# Patient Record
Sex: MALE | Race: OTHER | HISPANIC OR LATINO
[De-identification: names, ages, dates, MRNs, and addresses within clinical notes are randomized per-mention and may not be internally consistent; named-entity substitution may affect disease eponyms.]

---
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
## 2021-02-24 ENCOUNTER — NON-APPOINTMENT (OUTPATIENT)
Age: 21
End: 2021-02-24

## 2021-06-08 ENCOUNTER — EMERGENCY (EMERGENCY)
Facility: HOSPITAL | Age: 21
LOS: 1 days | Discharge: HOME | End: 2021-06-08
Admitting: EMERGENCY MEDICINE
Payer: MEDICAID

## 2021-06-08 VITALS
TEMPERATURE: 99 F | SYSTOLIC BLOOD PRESSURE: 127 MMHG | HEART RATE: 86 BPM | RESPIRATION RATE: 16 BRPM | OXYGEN SATURATION: 100 % | DIASTOLIC BLOOD PRESSURE: 80 MMHG

## 2021-06-08 PROCEDURE — 99284 EMERGENCY DEPT VISIT MOD MDM: CPT

## 2021-06-08 NOTE — ED PROVIDER NOTE - CLINICAL SUMMARY MEDICAL DECISION MAKING FREE TEXT BOX
This is a 21 yr old M, pmh ADHD, Asperger's with c/o acting out behaviour. As per ems, mother took him to the [employer] end of the service mother told him it is time for to leave he did not wanted to do that and started to yell and scream.   Pt states " end of the day  he is a normal human being, he did not threaten or hurt anyone"   Collateral info obtained by sw from father, refer to her note. There is no clinical evidence of intoxication, or any acute medical problem requiring immediate intervention.  Pt will be picked up by father.

## 2021-06-08 NOTE — ED ADULT NURSE NOTE - NSIMPLEMENTINTERV_GEN_ALL_ED
Implemented All Universal Safety Interventions:  [place] to call system. Call bell, personal items and telephone within reach. Instruct patient to call for assistance. Room bathroom lighting operational. Non-slip footwear when patient is off stretcher. Physically safe environment: no spills, clutter or unnecessary equipment. Stretcher in lowest position, wheels locked, appropriate side rails in place.

## 2021-06-08 NOTE — ED ADULT TRIAGE NOTE - CHIEF COMPLAINT QUOTE
pt brought in by ems from [facility], pt with hx of autism, pt started acting up with is mother, started yelling and screaming, currently calm and cooperative.

## 2021-06-08 NOTE — ED PROVIDER NOTE - OBJECTIVE STATEMENT
This is a 21 yr old M, pmh ADHD, Asperger's with c/o acting out behaviour. As per ems, mother took him to the [employer] end of the service mother told him it is time for to leave he did not wanted to do that and started to yell and scream.   Pt states " end of the day  he is a normal human being, he did not threaten or hurt anyone"
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
## 2021-06-08 NOTE — ED PROVIDER NOTE - CROS ED ROS STATEMENT
H/O PE- will  ? surgical requirement, will hold Eliquis for now until seen by ortho
all other ROS negative except as per HPI
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
## 2021-06-08 NOTE — ED PROVIDER NOTE - PATIENT PORTAL LINK FT
You can access the FollowMyHealth Patient Portal offered by [facility] by registering at the following website: [email or web address]. By joining [employer]’s FollowMyHealth portal, you will also be able to view your health information using other applications (apps) compatible with our system.

## 2021-06-08 NOTE — ED ADULT NURSE NOTE - CHIEF COMPLAINT QUOTE
pt brought in by ems from [facility], pt with hx of autism, pt started acting up with is mother, started yelling and screaming, currently calm and cooperative.

## 2021-11-17 PROBLEM — F84.5 ASPERGER'S SYNDROME: Chronic | Status: ACTIVE | Noted: 2021-06-08

## 2021-11-24 ENCOUNTER — OUTPATIENT (OUTPATIENT)
Dept: OUTPATIENT SERVICES | Age: 21
LOS: 1 days | End: 2021-11-24

## 2021-11-24 ENCOUNTER — APPOINTMENT (OUTPATIENT)
Dept: PEDIATRIC ADOLESCENT MEDICINE | Facility: HOSPITAL | Age: 21
End: 2021-11-24
Payer: MEDICAID

## 2021-11-24 VITALS
HEIGHT: 72.25 IN | BODY MASS INDEX: 25.92 KG/M2 | DIASTOLIC BLOOD PRESSURE: 71 MMHG | WEIGHT: 193.5 LBS | HEART RATE: 92 BPM | SYSTOLIC BLOOD PRESSURE: 127 MMHG

## 2021-11-24 DIAGNOSIS — R45.89 OTHER SYMPTOMS AND SIGNS INVOLVING EMOTIONAL STATE: ICD-10-CM

## 2021-11-24 DIAGNOSIS — F84.0 AUTISTIC DISORDER: ICD-10-CM

## 2021-11-24 DIAGNOSIS — Z00.00 ENCOUNTER FOR GENERAL ADULT MEDICAL EXAMINATION W/OUT ABNORMAL FINDINGS: ICD-10-CM

## 2021-11-24 DIAGNOSIS — B35.1 TINEA UNGUIUM: ICD-10-CM

## 2021-11-24 PROCEDURE — 99395 PREV VISIT EST AGE 18-39: CPT

## 2021-11-30 PROBLEM — B35.1 TINEA UNGUIUM: Status: ACTIVE | Noted: 2021-11-30

## 2021-11-30 PROBLEM — R45.89 DEPRESSED MOOD: Status: ACTIVE | Noted: 2019-02-01

## 2021-11-30 PROBLEM — F84.0 AUTISM SPECTRUM DISORDER REQUIRING VERY SUBSTANTIAL SUPPORT (LEVEL 3): Status: ACTIVE | Noted: 2017-02-08
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
## 2021-11-30 NOTE — PHYSICAL EXAM
[Alert] : alert [No Acute Distress] : no acute distress [Normocephalic] : normocephalic [Atraumatic] : atraumatic [EOMI Bilateral] : EOMI bilateral [PERRLA] : BLAIRE [Conjunctivae with no discharge] : conjunctivae with no discharge [Clear tympanic membranes with bony landmarks and light reflex present bilaterally] : clear tympanic membranes with bony landmarks and light reflex present bilaterally  [Pink Nasal Mucosa] : pink nasal mucosa [Nares Patent] : nares patent [No Discharge] : no discharge [Nonerythematous Oropharynx] : nonerythematous oropharynx [Palate Intact] : palate intact [Uvula Midline] : uvula midline [Supple, full passive range of motion] : supple, full passive range of motion [No Palpable Masses] : no palpable masses [Clear to Auscultation Bilaterally] : clear to auscultation bilaterally [Regular Rate and Rhythm] : regular rate and rhythm [Normal S1, S2 audible] : normal S1, S2 audible [No Murmurs] : no murmurs [Soft] : soft [NonTender] : non tender [Non Distended] : non distended [Normoactive Bowel Sounds] : normoactive bowel sounds [No Hepatomegaly] : no hepatomegaly [No Splenomegaly] : no splenomegaly [No Abnormal Lymph Nodes Palpated] : no abnormal lymph nodes palpated [Normal Muscle Tone] : normal muscle tone [No Gait Asymmetry] : no gait asymmetry [No pain or deformities with palpation of bone, muscles, joints] : no pain or deformities with palpation of bone, muscles, joints [+2 Patella DTR] : +2 patella DTR [Cranial Nerves Grossly Intact] : cranial nerves grossly intact [de-identified] : dryness and flaking of soles of both feet, compacted R big toe nail, no bleeding or swelling appreciated, no erythema

## 2021-11-30 NOTE — REVIEW OF SYSTEMS
[Constipation] : constipation [Dry Skin] : dry skin [Epistaxis] : epistaxis [Malaise] : no malaise [Difficulty with Sleep] : no difficulty with sleep [Headache] : no headache [Ear Pain] : no ear pain [Nasal Discharge] : no nasal discharge [Nasal Congestion] : no nasal congestion [Sinus Pressure] : no sinus pressure [Sore Throat] : no sore throat [Chest Pain] : no chest pain [Tachypnea] : not tachypneic [Cough] : no cough [Congestion] : no congestion [Shortness of Breath] : no shortness of breath [Appetite Changes] : no appetite changes [Vomiting] : no vomiting [Diarrhea] : no diarrhea [Abdominal Pain] : no abdominal pain [Weakness] : no weakness [Dizziness] : no dizziness [Rash] : no rash [Itching] : no itching [Enlarged Lymph Nodes] : no enlarged lymph nodes [Tender Lymph Nodes] : non tender  lymph nodes [Dysuria] : no dysuria [Hematuria] : no hematuria

## 2021-11-30 NOTE — DISCUSSION/SUMMARY
[FreeTextEntry1] : Tai is a 21 year old M, with PMHx of ASD, presenting for annual check-up. \par \par Plan: \par 1. Psych services: \par - Following psychiatric hospitalization at [facility], patient is being connected to outpatient psychiatric and psychological services to manage medications, therapy, and behavioral plan.\par - Patient to obtain Quantiferon Gold test for TB in lieu of PPD, along with documentation of physical exam (dad will call to discuss over the phone, as he had to leave for a work meeting during the appt)\par \par 2. tinea unguium \par - Refer to Podiatry for follow up. Recommend comfortable shoes in the interim and keeping area clean. \par \par 3. Immunizations:\par - Patient to receive HPV and flu shot at next visit following discussion and if consent given.\par \par Father to call office after Thanksgiving weekend to make appt for vaccines and csan do lab work then as well. \par

## 2021-12-01 ENCOUNTER — OUTPATIENT (OUTPATIENT)
Dept: OUTPATIENT SERVICES | Facility: HOSPITAL | Age: 21
LOS: 1 days | End: 2021-12-01
Payer: MEDICAID

## 2021-12-01 PROCEDURE — G9005: CPT

## 2021-12-14 DIAGNOSIS — Z71.89 OTHER SPECIFIED COUNSELING: ICD-10-CM

## 2022-10-17 ENCOUNTER — EMERGENCY (EMERGENCY)
Facility: HOSPITAL | Age: 22
LOS: 1 days | Discharge: HOME | End: 2022-10-17
Attending: EMERGENCY MEDICINE | Admitting: EMERGENCY MEDICINE

## 2022-10-17 VITALS
DIASTOLIC BLOOD PRESSURE: 76 MMHG | SYSTOLIC BLOOD PRESSURE: 129 MMHG | TEMPERATURE: 98 F | OXYGEN SATURATION: 100 % | HEART RATE: 92 BPM | RESPIRATION RATE: 18 BRPM

## 2022-10-17 VITALS
HEART RATE: 90 BPM | TEMPERATURE: 98 F | DIASTOLIC BLOOD PRESSURE: 72 MMHG | SYSTOLIC BLOOD PRESSURE: 129 MMHG | RESPIRATION RATE: 18 BRPM | OXYGEN SATURATION: 100 %

## 2022-10-17 DIAGNOSIS — F84.0 AUTISTIC DISORDER: ICD-10-CM

## 2022-10-17 LAB
ALBUMIN SERPL ELPH-MCNC: 4.5 G/DL — SIGNIFICANT CHANGE UP (ref 3.3–5)
ALP SERPL-CCNC: 51 U/L — SIGNIFICANT CHANGE UP (ref 40–120)
ALT FLD-CCNC: 17 U/L — SIGNIFICANT CHANGE UP (ref 4–41)
ANION GAP SERPL CALC-SCNC: 9 MMOL/L — SIGNIFICANT CHANGE UP (ref 7–14)
APAP SERPL-MCNC: 26 UG/ML — HIGH (ref 15–25)
APPEARANCE UR: CLEAR — SIGNIFICANT CHANGE UP
AST SERPL-CCNC: 25 U/L — SIGNIFICANT CHANGE UP (ref 4–40)
BASOPHILS # BLD AUTO: 0.02 K/UL — SIGNIFICANT CHANGE UP (ref 0–0.2)
BASOPHILS NFR BLD AUTO: 0.4 % — SIGNIFICANT CHANGE UP (ref 0–2)
BILIRUB DIRECT SERPL-MCNC: <0.2 MG/DL — SIGNIFICANT CHANGE UP (ref 0–0.3)
BILIRUB INDIRECT FLD-MCNC: >0.5 MG/DL — SIGNIFICANT CHANGE UP (ref 0–1)
BILIRUB SERPL-MCNC: 0.7 MG/DL — SIGNIFICANT CHANGE UP (ref 0.2–1.2)
BILIRUB UR-MCNC: NEGATIVE — SIGNIFICANT CHANGE UP
BUN SERPL-MCNC: 13 MG/DL — SIGNIFICANT CHANGE UP (ref 7–23)
CALCIUM SERPL-MCNC: 9.8 MG/DL — SIGNIFICANT CHANGE UP (ref 8.4–10.5)
CHLORIDE SERPL-SCNC: 103 MMOL/L — SIGNIFICANT CHANGE UP (ref 98–107)
CO2 SERPL-SCNC: 29 MMOL/L — SIGNIFICANT CHANGE UP (ref 22–31)
COLOR SPEC: SIGNIFICANT CHANGE UP
CREAT SERPL-MCNC: 1.21 MG/DL — SIGNIFICANT CHANGE UP (ref 0.5–1.3)
DIFF PNL FLD: NEGATIVE — SIGNIFICANT CHANGE UP
EGFR: 87 ML/MIN/1.73M2 — SIGNIFICANT CHANGE UP
EOSINOPHIL # BLD AUTO: 0.03 K/UL — SIGNIFICANT CHANGE UP (ref 0–0.5)
EOSINOPHIL NFR BLD AUTO: 0.6 % — SIGNIFICANT CHANGE UP (ref 0–6)
ETHANOL SERPL-MCNC: <10 MG/DL — SIGNIFICANT CHANGE UP
GLUCOSE SERPL-MCNC: 80 MG/DL — SIGNIFICANT CHANGE UP (ref 70–99)
GLUCOSE UR QL: NEGATIVE — SIGNIFICANT CHANGE UP
HCT VFR BLD CALC: 45.7 % — SIGNIFICANT CHANGE UP (ref 39–50)
HGB BLD-MCNC: 16 G/DL — SIGNIFICANT CHANGE UP (ref 13–17)
IANC: 2.68 K/UL — SIGNIFICANT CHANGE UP (ref 1.8–7.4)
IMM GRANULOCYTES NFR BLD AUTO: 0.2 % — SIGNIFICANT CHANGE UP (ref 0–0.9)
KETONES UR-MCNC: NEGATIVE — SIGNIFICANT CHANGE UP
LEUKOCYTE ESTERASE UR-ACNC: NEGATIVE — SIGNIFICANT CHANGE UP
LYMPHOCYTES # BLD AUTO: 2.15 K/UL — SIGNIFICANT CHANGE UP (ref 1–3.3)
LYMPHOCYTES # BLD AUTO: 40.6 % — SIGNIFICANT CHANGE UP (ref 13–44)
MCHC RBC-ENTMCNC: 29.3 PG — SIGNIFICANT CHANGE UP (ref 27–34)
MCHC RBC-ENTMCNC: 35 GM/DL — SIGNIFICANT CHANGE UP (ref 32–36)
MCV RBC AUTO: 83.5 FL — SIGNIFICANT CHANGE UP (ref 80–100)
MONOCYTES # BLD AUTO: 0.41 K/UL — SIGNIFICANT CHANGE UP (ref 0–0.9)
MONOCYTES NFR BLD AUTO: 7.7 % — SIGNIFICANT CHANGE UP (ref 2–14)
NEUTROPHILS # BLD AUTO: 2.68 K/UL — SIGNIFICANT CHANGE UP (ref 1.8–7.4)
NEUTROPHILS NFR BLD AUTO: 50.5 % — SIGNIFICANT CHANGE UP (ref 43–77)
NITRITE UR-MCNC: NEGATIVE — SIGNIFICANT CHANGE UP
NRBC # BLD: 0 /100 WBCS — SIGNIFICANT CHANGE UP (ref 0–0)
NRBC # FLD: 0 K/UL — SIGNIFICANT CHANGE UP (ref 0–0)
PCP SPEC-MCNC: SIGNIFICANT CHANGE UP
PH UR: 7 — SIGNIFICANT CHANGE UP (ref 5–8)
PLATELET # BLD AUTO: 163 K/UL — SIGNIFICANT CHANGE UP (ref 150–400)
POTASSIUM SERPL-MCNC: 4.3 MMOL/L — SIGNIFICANT CHANGE UP (ref 3.5–5.3)
POTASSIUM SERPL-SCNC: 4.3 MMOL/L — SIGNIFICANT CHANGE UP (ref 3.5–5.3)
PROT SERPL-MCNC: 7.2 G/DL — SIGNIFICANT CHANGE UP (ref 6–8.3)
PROT UR-MCNC: NEGATIVE — SIGNIFICANT CHANGE UP
RBC # BLD: 5.47 M/UL — SIGNIFICANT CHANGE UP (ref 4.2–5.8)
RBC # FLD: 14 % — SIGNIFICANT CHANGE UP (ref 10.3–14.5)
SALICYLATES SERPL-MCNC: <0.3 MG/DL — LOW (ref 15–30)
SARS-COV-2 RNA SPEC QL NAA+PROBE: SIGNIFICANT CHANGE UP
SODIUM SERPL-SCNC: 141 MMOL/L — SIGNIFICANT CHANGE UP (ref 135–145)
SP GR SPEC: 1.02 — SIGNIFICANT CHANGE UP (ref 1.01–1.05)
UROBILINOGEN FLD QL: SIGNIFICANT CHANGE UP
WBC # BLD: 5.3 K/UL — SIGNIFICANT CHANGE UP (ref 3.8–10.5)
WBC # FLD AUTO: 5.3 K/UL — SIGNIFICANT CHANGE UP (ref 3.8–10.5)

## 2022-10-17 PROCEDURE — 99285 EMERGENCY DEPT VISIT HI MDM: CPT

## 2022-10-17 PROCEDURE — 90792 PSYCH DIAG EVAL W/MED SRVCS: CPT | Mod: GC

## 2022-10-17 NOTE — ED PROVIDER NOTE - WET READ LAUNCH FT
There are no Wet Read(s) to document. 5-Fu Pregnancy And Lactation Text: This medication is Pregnancy Category X and contraindicated in pregnancy and in women who may become pregnant. It is unknown if this medication is excreted in breast milk.

## 2022-10-17 NOTE — ED BEHAVIORAL HEALTH ASSESSMENT NOTE - SUMMARY
The patient is a 22 year old young man, single, domiciled with parents and 3 siblings-all of which have developmental disabilities (microcephaly), works at a fast food restaurant, past history of ASD and ADHD not currently in psychiatric treatment, 2 prior psychiatric hospitalizations-- one about 1.5 years ago for aggression and over 5 years ago for behavioral issues, no prior SA or SI, no hx of NSSIB, history of verbal but not physical aggression, no legal issues, no relevant PMH, no substance abuse history, who is presenting after tylenol overdose, took 15-20x 500mg tablets and subsequently alerted his father who brought him to the ED.   On assessment, pt is not currently depressed, adamantly denies intent to harm himself by taking the 15-20x 500mg tablets of Tylenol, father corroborates that pt has no history of SA/SI or self harm and denies recent stressors or noting recent change in mood or behavior. Though pt is not at increased risk of intentional self harm, pt is at increased risk of unintentional self harm and should not have access to medications given this. At this time, pt is not interested in psychiatric hospitalization or outpatient treatment and does not meet criteria for inpatient admission at this time.       Plan:   -Treat and release once pt is medically cleared  -Safety planning completed with pt and his father, see note  -Instructed to call 911 or go to nearest ED should safety concerns arise.   -Pt given contact info for Crisis Center.

## 2022-10-17 NOTE — ED PROVIDER NOTE - CLINICAL SUMMARY MEDICAL DECISION MAKING FREE TEXT BOX
21 y/o, pmh ADHD, Asperger's presenting with overdose on Tylenol. Patient states that he overdosed on Tylenol at 12PM today and took 15-20 pills of Tylenol.     Tylenol 500mg dosage.  Plan for psych clearance labs, acetaminophen level.  Plan to use nomogram and consult toxicology as needed. Continue to monitor vitals. Placing patient on 1:1. Will obtain psych eval 21 y/o, pmh ADHD, Asperger's presenting with overdose on Tylenol. Patient states that he overdosed on Tylenol at 12PM today and took 15-20 pills of Tylenol.     Tylenol PM 500mg dosage. PE unremarkable for any acute findings.   Plan for psych clearance labs, acetaminophen level.  Plan to use nomogram and consult toxicology as needed. Continue to monitor vitals. Placing patient on 1:1. Will obtain psych eval 23 y/o, pmh ADHD, Asperger's presenting with overdose on Tylenol. Patient states that he overdosed on Tylenol at 12PM today and took 15-20 pills of Tylenol.     Tylenol PM 500mg dosage. Collateral obtained from family. PE unremarkable for any acute findings. Vital signs WNL.   Plan for psych clearance labs, acetaminophen level.   Plan to use nomogram and consult toxicology as needed. Continue to monitor vitals. Placing patient on 1:1. Will obtain  psych eval

## 2022-10-17 NOTE — ED PROVIDER NOTE - PATIENT PORTAL LINK FT
You can access the FollowMyHealth Patient Portal offered by [facility] by registering at the following website: [email or web address]. By joining [employer]’s FollowMyHealth portal, you will also be able to view your health information using other applications (apps) compatible with our system.

## 2022-10-17 NOTE — ED PROVIDER NOTE - PHYSICAL EXAMINATION
Physical Exam:    Gen: NAD, AOx3  Head: NCAT  HEENT: EOMI, PEERLA, normal conjunctiva, tongue midline, oral mucosa moist  Lung: CTAB, no respiratory distress, no wheezes/rhonchi/rales B/L  CV: RRR, no murmurs, rubs or gallops  Abd: soft, NT, ND, no guarding, no rigidity, no rebound tenderness, no CVA tenderness   MSK: no visible deformities, ROM normal in UE/LE, no back pain  Neuro: No focal sensory or motor deficits  Skin: Warm, well perfused, no rash, no leg swelling

## 2022-10-17 NOTE — ED ADULT TRIAGE NOTE - PAIN RATING/NUMBER SCALE (0-10): ACTIVITY
Ventricular Rate : 102   Atrial Rate : 102   P-R Interval : 161   QRS Duration : 84   Q-T Interval : 347   QTC Calculation(Bezet) : 452   P Axis : 70   R Axis : 35   T Axis : 71   Diagnosis : Sinus tachycardia~~Confirmed by Larry Denson ([id number]) on 3/17/2018 10:11:58 PM      6

## 2022-10-17 NOTE — ED BEHAVIORAL HEALTH ASSESSMENT NOTE - NSSUICPROTFACT_PSY_ALL_CORE
Responsibility to children, family, or others/Identifies reasons for living/Supportive social network of family or friends/Engaged in work or school/Quaker beliefs

## 2022-10-17 NOTE — ED PROVIDER NOTE - OBJECTIVE STATEMENT
23 y/o, pmh ADHD, Asperger's presenting with 23 y/o, pmh ADHD, Asperger's presenting with overdose on Tylenol. Patient states that he overdosed on Tylenol at 12PM today and took 15-20 pills of Tylenol. 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 Sates he gets "obsessive" and got "carried away." States he often gets carried away when eating and during repetitive tasks. Denies any current SI/HI. States he had x5 episodes of vomiting after ingestion. Endorsing headache. Denies any chest pain, shortness of breath, abdominal pain, diarrhea, constipation, syncopal episode. Collateral received from mother on phone who states it was Tylenol PM 500mg.

## 2022-10-17 NOTE — ED PROVIDER NOTE - NSICDXPASTMEDICALHX_GEN_ALL_CORE_FT
PAST MEDICAL HISTORY:  ADHD (attention deficit hyperactivity disorder)     Asperger syndrome     Sickle cell trait

## 2022-10-17 NOTE — ED ADULT NURSE NOTE - OBJECTIVE STATEMENT
Patient to the ED s/p ingesting half a bottle of acetaminophen. The patient could not recall how many tablets he consumed. The patient denies nausea, vomiting and diarrhea at this time.  Alert and oriented x3. Patient Father in intake with younger brother for unknown reason. Father able to verbalize the patient once had an episode of psychotic behavior after consuming tablets. Patient placed on 1:1. No distress noted at this time.

## 2022-10-17 NOTE — ED PROVIDER NOTE - NS ED ROS FT
CONSTITUTIONAL: No fevers, no chills  EYES: no visual changes, no eye pain  EARS: no ear drainage, no ear pain, no change in hearing  NOSE: no nasal congestion  MOUTH/THROAT: no sore throat  CV: No chest pain, no palpitations  RESP: No SOB, no cough  GI: No n/v/d, no abd pain  : no dysuria, no hematuria, no flank pain  MSK: no back pain  SKIN: no rashes  NEURO: +headache

## 2022-10-17 NOTE — ED ADULT NURSE REASSESSMENT NOTE - NS ED NURSE REASSESS COMMENT FT1
PT calm in room , easily arousable to verbal stimuli. no apparent distress noted. pt denies SI/HI at this time. will continue to monitor.

## 2022-10-17 NOTE — ED PROVIDER NOTE - PROGRESS NOTE DETAILS
Cuco Emanuel DO (PGY1)  Patient's father currently in intake having younger sibling evaluated. States no one witnessed overdose at home. States wife brought patient to [facility] urgent care prior to ER. Patient's father states he has never prior had SI/HI. States he has had a hx of erratic behavior with attempting to elope the home -3 years ago. Cuco Emanuel DO (PGY1)  Spoke with  attending - patient to be placed on  eval list. Stated if patient is medically cleared - psych ER team will come evaluation otherwise admission to medicine for inpatient psych team evaluation. Cuco Emanuel DO (PGY1)  Patient acetaminophen level at 26. Patient medically cleared for psych eval by . Discussed with ED attending Dr. Cline.

## 2022-10-17 NOTE — ED BEHAVIORAL HEALTH ASSESSMENT NOTE - NSBHATTESTCOMMENTATTENDFT_PSY_A_CORE
22M with autism spectrum disorder presents after Tylenol ingestion and consult requested to rule out suicidal ideation. Patient states he was taking Tylenol due to leg pain, continued taking pills waiting for relief. Denies wanting to hurt or kill self. Made himself vomit and told his family immediately. Level in the ED minimally positive and not concerning for toxicity. Patient denies major depressive episode, not manic or psychotic, no suicidal ideation or homicidal ideation. Family not concerned for safety, do not think pt requires hospitalization. Patient able to safety plan, future oriented, pt works and family is supportive. Not an imminent threat of harm to self or others meeting involuntary commitment standard. DC with family, safety planning done, advised that family secure medications due to pt's impulsivity and risk for unintentional self harm.

## 2022-10-17 NOTE — ED BEHAVIORAL HEALTH ASSESSMENT NOTE - DESCRIPTION
Pt calm and cooperative on arrival, denied CP, N/V, SOB or lightheadedness on interview. PE unremarkable for any acute findings. Vital signs WNL.     Acetaminophen level =26     Vital Signs Last 24 Hrs  T(C): 36.7 (17 Oct 2022 15:56), Max: 36.7 (17 Oct 2022 15:56)  T(F): 98 (17 Oct 2022 15:56), Max: 98 (17 Oct 2022 15:56)  HR: 85 (17 Oct 2022 17:15) (85 - 90)  BP: 121/81 (17 Oct 2022 17:15) (121/81 - 129/72)  BP(mean): --  RR: 16 (17 Oct 2022 17:15) (16 - 18)  SpO2: 100% (17 Oct 2022 17:15) (100% - 100%)    Parameters below as of 17 Oct 2022 17:15  Patient On (Oxygen Delivery Method): room air sickle cell carrier Pt lives in [place] with mom, dad and 3 siblings

## 2022-10-17 NOTE — ED BEHAVIORAL HEALTH ASSESSMENT NOTE - HPI (INCLUDE ILLNESS QUALITY, SEVERITY, DURATION, TIMING, CONTEXT, MODIFYING FACTORS, ASSOCIATED SIGNS AND SYMPTOMS)
The patient is a 22 year old young man, single, domiciled with parents and 3 siblings-all of which have developmental disabilities (microcephaly), works at a fast food restaurant, past history of ASD and ADHD not currently in psychiatric treatment, 2 prior psychiatric hospitalizations-- one about 1.5 years ago for aggression and over 5 years ago for behavioral issues, no prior SA or SI, no hx of NSSIB, history of verbal but not physical aggression, no legal issues, no relevant PMH, no substance abuse history, who is presenting after tylenol overdose, took 15-20x 500mg tablets and subsequently alerted his father who brought him to the ED.     On interview, pt is calm and cooperative, states he took the medication because he had hurt his leg earlier in the day while at work, carrying heavy boxes. States he didn't count how many pills he took and didn't really think about how many was an appropriate dose. Pt denies thinking that taking that number of pills would be harmful to him, but notes that a few minutes after taking them, he started to feel lightheaded and felt like his heart was pounding, which made him think that maybe he had taken too many pills. At this point, pt attempted to make himself vomit, but was unsuccessful, so he called his father and asked him for a ride to the hospital. Patient adamantly denies any desire to harm himself at the time, states he has never questioned whether or not he was wanted to live and denies ever intentionally hurting himself in the past. Pt denies depressed mood, issues sleeping, appetite changes, anhedonia or feelings of guilt/worthlessness. Further denies excessive worry, denies current or historical psychotic or manic symptoms. Pt states he does not drink or use recreational drugs.     States he saw a psychiatrist in the past, but that he cannot remember why he saw them, states he currently does not see a psychiatrist because he "doesn't need to see one."  Pt denies recent stressors aside from starting a new job recently, but states he enjoys the work. States that his family mostly gets along, sometimes there are some arguments, but denies arguments escalating physically. Enjoys video games and watching movies.       Collateral from pt's father, Viktor Ryan:  Viktor explains that pt The patient is a 22 year old young man, single, domiciled with parents and 3 siblings-all of which have developmental disabilities (microcephaly), works at a fast food restaurant, past history of ASD and ADHD not currently in psychiatric treatment, 2 prior psychiatric hospitalizations-- one about 1.5 years ago for aggression and over 5 years ago for behavioral issues, no prior SA or SI, no hx of NSSIB, history of verbal but not physical aggression, no legal issues, no relevant PMH, no substance abuse history, who is presenting after tylenol overdose, took 15-20x 500mg tablets and subsequently alerted his father who brought him to the ED.     On interview, pt is calm and cooperative, states he took the medication because he had hurt his leg earlier in the day while at work, carrying heavy boxes. States he didn't count how many pills he took and didn't really think about how many was an appropriate dose. Pt denies thinking that taking that number of pills would be harmful to him, but notes that a few minutes after taking them, he started to feel lightheaded and felt like his heart was pounding, which made him think that maybe he had taken too many pills. At this point, pt attempted to make himself vomit, but was unsuccessful, so he called his father and asked him for a ride to the hospital. Patient adamantly denies any desire to harm himself at the time, states he has never questioned whether or not he was wanted to live and denies ever intentionally hurting himself in the past. Pt denies depressed mood, issues sleeping, appetite changes, anhedonia or feelings of guilt/worthlessness. Further denies excessive worry, denies current or historical psychotic or manic symptoms. Pt states he does not drink or use recreational drugs.     States he saw a psychiatrist in the past, but that he cannot remember why he saw them, states he currently does not see a psychiatrist because he "doesn't need to see one."  Pt denies recent stressors aside from starting a new job recently, but states he enjoys the work. States that his family mostly gets along, sometimes there are some arguments, but denies arguments escalating physically. Enjoys video games and watching movies.       Collateral from pt's father, Viktor Ryan:  Viktor explains that pt called him earlier today telling him that he took too much medication and was feeling unwell, requested that father bring him to the hospital. Father states he does not believe pt was intentionally trying to hurt himself, states pt has never expressed  thoughts of wanting to hurt himself and has never attempted to do so in the past. Father also does not feel pt has been more depressed lately, but does note pt has been having a difficult time sleeping lately since starting a new job because his sleep schedule has been thrown off. States pt was hospitalized about 1.5 years ago for largely behavioral issues. At the time, pt became upset with family and told them he was going to move out-- pt left the home with a knife in his backpack and family called EMS and was subsequently hospitalized. Pt was in treatment for a brief period of time after hospitalization with a therapist/psychiatrist but discontinued treatment The patient is a 22 year old young man, single, domiciled with parents and 3 siblings-all of which have developmental disabilities (microcephaly), works at a fast food restaurant, past history of ASD and ADHD not currently in psychiatric treatment, 2 prior psychiatric hospitalizations-- one about 1.5 years ago for aggression and over 5 years ago for behavioral issues, no prior SA or SI, no hx of NSSIB, history of verbal but not physical aggression, no legal issues, no relevant PMH, no substance abuse history, who is presenting after tylenol overdose, took 15-20x 500mg tablets and subsequently alerted his father who brought him to the ED.     On interview, pt is calm and cooperative, states he took the medication because he had hurt his leg earlier in the day while at work, carrying heavy boxes. States he didn't count how many pills he took and didn't really think about how many was an appropriate dose. Pt denies thinking that taking that number of pills would be harmful to him, but notes that a few minutes after taking them, he started to feel lightheaded and felt like his heart was pounding, which made him think that maybe he had taken too many pills. At this point, pt attempted to make himself vomit, but was unsuccessful, so he called his father and asked him for a ride to the hospital. Patient adamantly denies any desire to harm himself at the time, states he has never questioned whether or not he was wanted to live and denies ever intentionally hurting himself in the past. Pt denies depressed mood, issues sleeping, appetite changes, anhedonia or feelings of guilt/worthlessness. Further denies excessive worry, denies current or historical psychotic or manic symptoms. Pt states he does not drink or use recreational drugs.     States he saw a psychiatrist in the past, but that he cannot remember why he saw them, states he currently does not see a psychiatrist because he "doesn't need to see one."  Pt denies recent stressors aside from starting a new job recently, but states he enjoys the work. States that his family mostly gets along, sometimes there are some arguments, but denies arguments escalating physically. Enjoys video games and watching movies.       Collateral from pt's father, Viktor Ryan:  Viktor explains that pt called him earlier today telling him that he took too much medication and was feeling unwell, requested that father bring him to the hospital. Father states he does not believe pt was intentionally trying to hurt himself, states pt has never expressed  thoughts of wanting to hurt himself and has never attempted to do so in the past. Father also does not feel pt has been more depressed lately, but does note pt has been having a difficult time sleeping lately since starting a new job because his sleep schedule has been thrown off. States pt was hospitalized about 1.5 years ago for largely behavioral issues. At the time, pt became upset with family and told them he was going to move out-- pt left the home with a knife in his backpack and family called EMS and was subsequently hospitalized. Pt was in treatment for a brief period of time after hospitalization with a therapist/psychiatrist but discontinued treatment. Father states there are no guns in the home.

## 2022-10-17 NOTE — ED PROVIDER NOTE - NSFOLLOWUPINSTRUCTIONS_ED_ALL_ED_FT
Advance activity as tolerated.  Continue all previously prescribed medications as directed unless otherwise instructed.  Follow up with your primary care physician in 48-72 hours- bring copies of your results.  Return to the ER for worsening or persistent symptoms, and/or ANY NEW OR CONCERNING SYMPTOMS. If you have issues obtaining follow up, please call: [phone] to obtain a doctor or specialist who takes your insurance in your area.  You may call [phone] to make an appointment with the internal medicine clinic.

## 2022-10-17 NOTE — ED ADULT NURSE NOTE - NSIMPLEMENTINTERV_GEN_ALL_ED
Implemented All Universal Safety Interventions:  [place] to call system. Call bell, personal items and telephone within reach. Instruct patient to call for assistance. Room bathroom lighting operational. Non-slip footwear when patient is off stretcher. Physically safe environment: no spills, clutter or unnecessary equipment. Stretcher in lowest position, wheels locked, appropriate side rails in place.

## 2022-10-17 NOTE — ED BEHAVIORAL HEALTH ASSESSMENT NOTE - RISK ASSESSMENT
Low Acute Suicide Risk Pt is at increased risk for suicide given Pt is at increased risk for suicide given autism spectrum, impulsivity, history of aggression and prior hospitalizations. protective factors include no suicidal ideation, no suicide attempt, no substance use, no access to weapons, no homicidal ideation, no mood or psychotic symptoms, help seeking, supportive family. LOW acute risk, will mitigate risk further by safety planning, providing outpatient resources, and flagging pt for post-discharge SW follow up

## 2022-10-18 NOTE — ED BEHAVIORAL HEALTH NOTE - BEHAVIORAL HEALTH NOTE
high risk log:  writer called [phone] and worker was unable to leave message because "memory is full".

## 2022-10-19 NOTE — ED BEHAVIORAL HEALTH NOTE - BEHAVIORAL HEALTH NOTE
High Risk Log: Writer called pt .  Pt reports feeling fine and does not need treatment, states it was a one time incident.

## 2022-11-21 ENCOUNTER — APPOINTMENT (OUTPATIENT)
Dept: PEDIATRIC ADOLESCENT MEDICINE | Facility: HOSPITAL | Age: 22
End: 2022-11-21

## 2022-11-28 ENCOUNTER — APPOINTMENT (OUTPATIENT)
Dept: PEDIATRIC ADOLESCENT MEDICINE | Facility: HOSPITAL | Age: 22
End: 2022-11-28

## 2022-11-30 ENCOUNTER — APPOINTMENT (OUTPATIENT)
Dept: PEDIATRIC ADOLESCENT MEDICINE | Facility: HOSPITAL | Age: 22
End: 2022-11-30

## 2022-12-14 ENCOUNTER — APPOINTMENT (OUTPATIENT)
Dept: PEDIATRIC ADOLESCENT MEDICINE | Facility: HOSPITAL | Age: 22
End: 2022-12-14

## 2023-01-04 ENCOUNTER — APPOINTMENT (OUTPATIENT)
Dept: PEDIATRIC ADOLESCENT MEDICINE | Facility: HOSPITAL | Age: 23
End: 2023-01-04

## 2023-03-01 ENCOUNTER — APPOINTMENT (OUTPATIENT)
Dept: PEDIATRIC ADOLESCENT MEDICINE | Facility: HOSPITAL | Age: 23
End: 2023-03-01

## 2023-05-23 NOTE — ED BEHAVIORAL HEALTH ASSESSMENT NOTE - SUMMARY
Pt physically aggressive with staff and not redirectable  Pt attempting to hit and kick staff  Soft wrist restraints applied at this time  Primary RN contacting provider for order  16 yo M with ASD BIB EMS with worsening aggressive behavior.  Patient has a psychologist and is seeing a psychiatrist next week.  Patient's intermittent aggression is concerning, however patient is calm at this time and there are no acute safety concerns that a short-term hospitalization will likely improve.  Patient's behaviors do appear to have a conscious volitional aspect to them in addition to psychiatric dysregulation.  Will be able to discharge patient given follow-up next week.  Provided father with strict return indications for patient being violent toward others or self or generally unsafe.

## 2024-01-25 NOTE — ED PROVIDER NOTE - CPE EDP CARDIAC NORM
Airway    Performed by: Hood Dean MD  Authorized by: Hood Dean MD    Final Airway Type:  Endotracheal airway  Final Endotracheal Airway*:  ETT  ETT Size (mm)*:  7.5  Cuff*:  Regular  Technique Used for Successful ETT Placement:  Video laryngoscopy  Devices/Methods Used in Placement*:  Mask  Intubation Procedure*:  ETCO2, Preoxygenation, Atraumatic, Dentition Unchanged and Pharynx Clear  Insertion Site:  Oral  Blade Type*:  Video Laryngoscope  Placement Verified by: auscultation, capnometry and palpation of cuff    Glottic View*:  1 - full view of glottis  Attempts*:  1  Ventilation Between Attempts:  2 hand mask  Number of Other Approaches Attempted:  0   Patient Identified, Procedure confirmed, Emergency equipment available and Safety protocols followed  Location:  OR  Urgency:  Elective  Difficult Airway: No    Indications for Airway Management:  Anesthesia  Sedation Level:  Anesthetized  Mask Difficulty Assessment:  1 - vent by mask  Start Time: 1/25/2024 5:07 PM   Easy mask and easy intubation with elective Willis (+dentasafe)  Ull view of cords, +BBS, +AT, +[id number]      
normal...
Tranexamic Acid Pregnancy And Lactation Text: It is unknown if this medication is safe during pregnancy or breast feeding.

## 2025-01-24 NOTE — ED ADULT TRIAGE NOTE - NS ED TRIAGE AVPU SCALE
Alert-The patient is alert, awake and responds to voice. The patient is oriented to time, place, and person. The triage nurse is able to obtain subjective information.
[Symptom and Test Evaluation] : symptom and test evaluation